# Patient Record
Sex: MALE | Race: BLACK OR AFRICAN AMERICAN
[De-identification: names, ages, dates, MRNs, and addresses within clinical notes are randomized per-mention and may not be internally consistent; named-entity substitution may affect disease eponyms.]

---

## 2017-04-03 ENCOUNTER — HOSPITAL ENCOUNTER (EMERGENCY)
Dept: HOSPITAL 17 - NED | Age: 22
Discharge: LEFT BEFORE DISCHARGE | End: 2017-04-03
Payer: COMMERCIAL

## 2017-04-03 VITALS
TEMPERATURE: 98 F | DIASTOLIC BLOOD PRESSURE: 65 MMHG | RESPIRATION RATE: 20 BRPM | HEART RATE: 77 BPM | OXYGEN SATURATION: 99 % | SYSTOLIC BLOOD PRESSURE: 127 MMHG

## 2017-04-03 DIAGNOSIS — R06.2: Primary | ICD-10-CM

## 2017-04-03 DIAGNOSIS — Z53.21: ICD-10-CM

## 2017-04-03 PROCEDURE — 93005 ELECTROCARDIOGRAM TRACING: CPT

## 2017-04-03 PROCEDURE — 71020: CPT

## 2017-04-03 NOTE — PD
Physical Exam


Date Seen by Provider:  Apr 3, 2017


Time Seen by Provider:  17:45


Narrative


21 YOBM C/O CP ASSOCIATED WITH COLD THIS PAST WEEK. POS F/C,COUGH,SOB,WHEEZING. 

WORSE WITH DEEP BREATH AND MOVEMENT





VVS. AWAITING BED PLACEMENT





Data


Data


Last Documented VS





Vital Signs








  Date Time  Temp Pulse Resp B/P Pulse Ox O2 Delivery O2 Flow Rate FiO2


 


4/3/17 15:29 98.0 77 20 127/65 99 Room Air  








Orders





 Electrocardiogram (4/3/17 17:44)


Chest, Pa & Lat (4/3/17 17:44)








MDM


Medical Record Reviewed:  Yes


Supervised Visit with BILLY:  Yes








Curt Strickland Apr 3, 2017 17:47

## 2017-04-04 NOTE — EKG
Date Performed: 04/03/2017       Time Performed: 17:48:15

 

PTAGE:      21 years

 

EKG:      Sinus rhythm 

 

 NORMAL ECG 

 

NO PREVIOUS TRACING            

 

DOCTOR:   Tatianna Oneal  Interpretating Date/Time  04/04/2017 10:25:53

## 2017-07-08 ENCOUNTER — HOSPITAL ENCOUNTER (EMERGENCY)
Dept: HOSPITAL 17 - NEPC | Age: 22
LOS: 1 days | Discharge: HOME | End: 2017-07-09
Payer: SELF-PAY

## 2017-07-08 VITALS — WEIGHT: 194.01 LBS | HEIGHT: 70 IN | BODY MASS INDEX: 27.77 KG/M2

## 2017-07-08 VITALS
OXYGEN SATURATION: 96 % | SYSTOLIC BLOOD PRESSURE: 132 MMHG | TEMPERATURE: 99.3 F | DIASTOLIC BLOOD PRESSURE: 85 MMHG | RESPIRATION RATE: 16 BRPM | HEART RATE: 88 BPM

## 2017-07-08 DIAGNOSIS — A08.4: Primary | ICD-10-CM

## 2017-07-08 DIAGNOSIS — J45.909: ICD-10-CM

## 2017-07-08 LAB
BASOPHILS # BLD AUTO: 0 TH/MM3 (ref 0–0.2)
BASOPHILS NFR BLD: 0.5 % (ref 0–2)
EOSINOPHIL # BLD: 0 TH/MM3 (ref 0–0.4)
EOSINOPHIL NFR BLD: 0.4 % (ref 0–4)
ERYTHROCYTE [DISTWIDTH] IN BLOOD BY AUTOMATED COUNT: 15.3 % (ref 11.6–17.2)
HCT VFR BLD CALC: 41.3 % (ref 39–51)
HEMO FLAGS: (no result)
LYMPHOCYTES # BLD AUTO: 1.6 TH/MM3 (ref 1–4.8)
LYMPHOCYTES NFR BLD AUTO: 34.6 % (ref 9–44)
MCH RBC QN AUTO: 24.5 PG (ref 27–34)
MCHC RBC AUTO-ENTMCNC: 32.1 % (ref 32–36)
MCV RBC AUTO: 76.4 FL (ref 80–100)
MONOCYTES NFR BLD: 16.6 % (ref 0–8)
NEUTROPHILS # BLD AUTO: 2.1 TH/MM3 (ref 1.8–7.7)
NEUTROPHILS NFR BLD AUTO: 47.9 % (ref 16–70)
PLATELET # BLD: 201 TH/MM3 (ref 150–450)
RBC # BLD AUTO: 5.41 MIL/MM3 (ref 4.5–5.9)
WBC # BLD AUTO: 4.5 TH/MM3 (ref 4–11)

## 2017-07-08 PROCEDURE — 83690 ASSAY OF LIPASE: CPT

## 2017-07-08 PROCEDURE — 80053 COMPREHEN METABOLIC PANEL: CPT

## 2017-07-08 PROCEDURE — 96374 THER/PROPH/DIAG INJ IV PUSH: CPT

## 2017-07-08 PROCEDURE — 85025 COMPLETE CBC W/AUTO DIFF WBC: CPT

## 2017-07-08 PROCEDURE — 96361 HYDRATE IV INFUSION ADD-ON: CPT

## 2017-07-08 PROCEDURE — 99284 EMERGENCY DEPT VISIT MOD MDM: CPT

## 2017-07-08 PROCEDURE — 87804 INFLUENZA ASSAY W/OPTIC: CPT

## 2017-07-08 NOTE — PD
HPI


Chief Complaint:  GI Complaint


Time Seen by Provider:  23:21


Travel History


International Travel<30 days:  No


Contact w/Intl Traveler<30days:  No


Traveled to known affect area:  No





History of Present Illness


HPI


C/O NV/D AND CRAMPY ABD PAIN ONGOING FOR 1 DAY, NO IMPROVEMENT YET, PT DENIES 

FEVER/HA/CP, STATES THAT CURRENTLY IS A BIT NAUSEOUS BUT NOT IN PAIN





PFSH


Past Medical History


Medical History:  Denies Significant Hx


Asthma:  Yes


Diminished Hearing:  No


Immunizations Current:  Yes


Tetanus Vaccination:  Unknown





Past Surgical History


Surgical History:  No Previous Surgery





Social History


Alcohol Use:  No (occ)


Tobacco Use:  No


Substance Use:  No





Allergies-Medications


(Allergen,Severity, Reaction):  


Coded Allergies:  


     No Known Allergies (Verified , 7/8/17)


Reported Meds & Prescriptions





Reported Meds & Active Scripts


Active


Zofran Odt (Ondansetron Odt) 4 Mg Tab 4 Mg SL Q6HR PRN


Ultram (Tramadol HCl) 50 Mg Tab 50 Mg PO Q4H PRN


Lomotil (Diphenoxylate-Atropine) 2.5-0.025 Mg Tab 1 Tab PO Q6H PRN








Review of Systems


Except as stated in HPI:  all other systems reviewed are Neg


Gastrointestinal:  Positive: Nausea, Vomiting, Diarrhea, Abdominal Pain





Physical Exam


Narrative


GENERAL: 


SKIN: Warm and dry.


HEAD: Atraumatic. Normocephalic. 


EYES: Pupils equal and round. No scleral icterus. No injection or drainage. 


ENT: No nasal bleeding or discharge.  Mucous membranes pink and moist.


NECK: Trachea midline. No JVD. 


CARDIOVASCULAR: Regular rate and rhythm.  


RESPIRATORY: No accessory muscle use. Clear to auscultation. Breath sounds 

equal bilaterally. 


GASTROINTESTINAL: Abdomen soft, non-tender, nondistended. Hepatic and splenic 

margins not palpable. 


MUSCULOSKELETAL: Extremities without clubbing, cyanosis, or edema. No obvious 

deformities. 


NEUROLOGICAL: Awake and alert. No obvious cranial nerve deficits.  Motor 

grossly within normal limits. Five out of 5 muscle strength in the arms and 

legs.  Normal speech.


PSYCHIATRIC: Appropriate mood and affect; insight and judgment normal.





Data


Data


Last Documented VS





Vital Signs








  Date Time  Temp Pulse Resp B/P Pulse Ox O2 Delivery O2 Flow Rate FiO2


 


7/8/17 23:17 99.3 88 16 132/85 96   








Orders





 Complete Blood Count With Diff (7/8/17 23:21)


Comprehensive Metabolic Panel (7/8/17 23:21)


Lipase (7/8/17 23:21)


Influenzae A/B Antigen (7/8/17 23:21)


Iv Access Insert/Monitor (7/8/17 23:21)


Ecg Monitoring (7/8/17 23:21)


Oximetry (7/8/17 23:21)


Sodium Chlor 0.9% 1000 Ml Inj (Ns 1000 M (7/8/17 23:30)


Ondansetron Inj (Zofran Inj) (7/8/17 23:30)





Labs





 Laboratory Tests








Test 7/8/17





 23:30


 


White Blood Count 4.5 TH/MM3


 


Red Blood Count 5.41 MIL/MM3


 


Hemoglobin 13.3 GM/DL


 


Hematocrit 41.3 %


 


Mean Corpuscular Volume 76.4 FL


 


Mean Corpuscular Hemoglobin 24.5 PG


 


Mean Corpuscular Hemoglobin 32.1 %





Concent 


 


Red Cell Distribution Width 15.3 %


 


Platelet Count 201 TH/MM3


 


Mean Platelet Volume 6.8 FL


 


Neutrophils (%) (Auto) 47.9 %


 


Lymphocytes (%) (Auto) 34.6 %


 


Monocytes (%) (Auto) 16.6 %


 


Eosinophils (%) (Auto) 0.4 %


 


Basophils (%) (Auto) 0.5 %


 


Neutrophils # (Auto) 2.1 TH/MM3


 


Lymphocytes # (Auto) 1.6 TH/MM3


 


Monocytes # (Auto) 0.7 TH/MM3


 


Eosinophils # (Auto) 0.0 TH/MM3


 


Basophils # (Auto) 0.0 TH/MM3


 


CBC Comment DIFF FINAL 


 


Differential Comment  


 


Sodium Level 138 MEQ/L


 


Potassium Level 3.6 MEQ/L


 


Chloride Level 105 MEQ/L


 


Carbon Dioxide Level 26.5 MEQ/L


 


Anion Gap 7 MEQ/L


 


Blood Urea Nitrogen 16 MG/DL


 


Creatinine 1.14 MG/DL


 


Estimat Glomerular Filtration 96 ML/MIN





Rate 


 


Random Glucose 89 MG/DL


 


Calcium Level 9.1 MG/DL


 


Total Bilirubin 0.3 MG/DL


 


Aspartate Amino Transf 28 U/L





(AST/SGOT) 


 


Alanine Aminotransferase 44 U/L





(ALT/SGPT) 


 


Alkaline Phosphatase 56 U/L


 


Total Protein 7.5 GM/DL


 


Albumin 4.0 GM/DL


 


Lipase 90 U/L











[facility]


Medical Decision Making


Medical Screen Exam Complete:  Yes


Emergency Medical Condition:  Yes


Medical Record Reviewed:  Yes


Differential Diagnosis


VIRAL V BACTERIAL ENTERITIS V LIVER/PANCREATITIS


Narrative Course


PATIENT EVALUATED AND NOT FOUND TO HAVE ANY COMPLICATIONS (SUCH AS HEPATITIS, 

PANCREATITIS, ELECTROLYTE ABNL) PAIN CONTROLLED AND TOLERATED PO, WILL D/C HOME





Diagnosis





 Primary Impression:  


 VIRAL GASTROENTERITIS


Scripts


Ondansetron Odt (Zofran Odt)4 Mg Tab4 Mg SL Q6HR PRN (Nausea/Vomiting) #12 TAB


   Prov:Denis Lorenzo MD         7/9/17 


Tramadol (Ultram)50 Mg Tab50 Mg PO Q4H PRN (PAIN) #28 TAB


   Prov:Denis Lorenzo MD         7/9/17 


Diphenoxylate-Atropine (Lomotil)2.5-0.025 Mg Tab1 Tab PO Q6H PRN (DIARRHEA) #20 

TAB


   Prov:Denis Lorenzo MD         7/9/17


Disposition:  01 DISCHARGE HOME


Condition:  Stable








Denis Lorenzo MD Jul 8, 2017 23:30

## 2017-07-09 LAB
ALP SERPL-CCNC: 56 U/L (ref 45–117)
ALT SERPL-CCNC: 44 U/L (ref 12–78)
ANION GAP SERPL CALC-SCNC: 7 MEQ/L (ref 5–15)
AST SERPL-CCNC: 28 U/L (ref 15–37)
BILIRUB SERPL-MCNC: 0.3 MG/DL (ref 0.2–1)
BUN SERPL-MCNC: 16 MG/DL (ref 7–18)
CHLORIDE SERPL-SCNC: 105 MEQ/L (ref 98–107)
GFR SERPLBLD BASED ON 1.73 SQ M-ARVRAT: 96 ML/MIN (ref 89–?)
HCO3 BLD-SCNC: 26.5 MEQ/L (ref 21–32)
POTASSIUM SERPL-SCNC: 3.6 MEQ/L (ref 3.5–5.1)
SODIUM SERPL-SCNC: 138 MEQ/L (ref 136–145)

## 2017-07-11 ENCOUNTER — HOSPITAL ENCOUNTER (EMERGENCY)
Dept: HOSPITAL 17 - NEPD | Age: 22
Discharge: HOME | End: 2017-07-11
Payer: SELF-PAY

## 2017-07-11 VITALS — HEIGHT: 70 IN | BODY MASS INDEX: 28.41 KG/M2 | WEIGHT: 198.42 LBS

## 2017-07-11 VITALS
OXYGEN SATURATION: 99 % | SYSTOLIC BLOOD PRESSURE: 132 MMHG | DIASTOLIC BLOOD PRESSURE: 71 MMHG | RESPIRATION RATE: 15 BRPM | HEART RATE: 56 BPM | TEMPERATURE: 98.2 F

## 2017-07-11 DIAGNOSIS — R51: Primary | ICD-10-CM

## 2017-07-11 PROCEDURE — 96361 HYDRATE IV INFUSION ADD-ON: CPT

## 2017-07-11 PROCEDURE — 96375 TX/PRO/DX INJ NEW DRUG ADDON: CPT

## 2017-07-11 PROCEDURE — 96374 THER/PROPH/DIAG INJ IV PUSH: CPT

## 2017-07-11 PROCEDURE — 99284 EMERGENCY DEPT VISIT MOD MDM: CPT

## 2017-07-11 NOTE — PD
Physical Exam


Time Seen by Provider:  16:00


Narrative


22 y/o male with n/v/d, headache, congestion.  Seen here on 7/8 and dx with 

viral gastroenteritis.  The vomiting and diarrhea have resolved but the 

congestion and h/a persist.








Vital signs reviewed.


Seen at triage desk.  Awaiting bed placement.





Data


Data


Last Documented VS





Vital Signs








  Date Time  Temp Pulse Resp B/P Pulse Ox O2 Delivery O2 Flow Rate FiO2


 


7/11/17 15:31 98.2 56 15 132/71 99   











MDM


Medical Record Reviewed:  Yes


Supervised Visit with BILLY:  Gerardo Valadez Jul 11, 2017 16:02

## 2017-07-11 NOTE — PD
HPI


.


Cold


Chief Complaint:  Cold / Flu Symptoms


Time Seen by Provider:  17:36


Travel History


International Travel<30 days:  No


Contact w/Intl Traveler<30days:  No


Traveled to known affect area:  No





History of Present Illness


HPI


Patient presents with about a 3 or 4 day history of cold symptoms.  He is 

complaining with nasal congestion, rhinorrhea, headache, dizziness, photophobia 

and loose stools.  He was seen here a couple days ago with loose stools.  He 

states that that has improved.  He reports one loose stool while here waiting 

for room.  He states that his head pain is an aching sensation just unrelieved 

by Aleve and which he rates as severe.  He reports no relieving or exacerbating 

factor.





PFSH


Past Medical History


Asthma:  Yes


Diminished Hearing:  No


Immunizations Current:  Yes


Influenza Vaccination:  No





Social History


Alcohol Use:  No (occ)


Tobacco Use:  No


Substance Use:  No





Allergies-Medications


(Allergen,Severity, Reaction):  


Coded Allergies:  


     No Known Allergies (Verified , 7/8/17)


Reported Meds & Prescriptions





Reported Meds & Active Scripts


Active


No Active Prescriptions or Reported Medications    








Review of Systems


Except as stated in HPI:  all other systems reviewed are Neg


General / Constitutional:  No: Fever, Chills


Eyes:  Positive: Photophobia,  No: Drainage, Redness


HENT:  Positive: Headaches, Congestion


Cardiovascular:  No: Chest Pain or Discomfort


Respiratory:  No: Cough


Gastrointestinal:  Positive: Diarrhea,  No: Nausea, Vomiting, Abdominal Pain





Physical Exam


Narrative


GENERAL: Patient is awake and alert.


SKIN: Warm and dry.


HEAD: Atraumatic. Normocephalic.  Scalp is nontender.


EYES: Pupils equal and round.  Extraocular movements are intact.


ENT: No nasal bleeding or discharge.  Mucous membranes pink and moist.  Sinuses 

are nontender to percussion.


NECK: Trachea midline.  Neck is supple with no cervical lymphadenopathy.


CARDIOVASCULAR: Regular rate and rhythm.  Heart sounds are normal.


RESPIRATORY: No accessory muscle use.  Lungs are clear with full air movement 

throughout.


GASTROINTESTINAL: Abdomen soft, non-tender, nondistended. 


MUSCULOSKELETAL: No obvious deformities.   No edema. 


NEUROLOGICAL: Awake and alert. No obvious cranial nerve deficits.  Motor 

grossly within normal limits. Normal speech.


PSYCHIATRIC: Appropriate mood and affect; insight and judgment normal.





Data


Data


Last Documented VS





Vital Signs








  Date Time  Temp Pulse Resp B/P Pulse Ox O2 Delivery O2 Flow Rate FiO2


 


7/11/17 17:47   18   Room Air  


 


7/11/17 15:31 98.2 56  132/71 99   








Orders





 Ecg Monitoring (7/11/17 17:46)


Iv Access Insert/Monitor (7/11/17 17:46)


Oximetry (7/11/17 17:46)


Sodium Chloride 0.9% Flush (Ns Flush) (7/11/17 18:00)


Ketorolac Inj (Toradol Inj) (7/11/17 18:00)


Prochlorperazine Inj (Compazine Inj) (7/11/17 18:00)


Diphenhydramine Inj (Benadryl Inj) (7/11/17 18:00)


Sodium Chlor 0.9% 1000 Ml Inj (Ns 1000 M (7/11/17 17:46)








MDM


Medical Decision Making


Medical Screen Exam Complete:  Yes


Emergency Medical Condition:  Yes


Differential Diagnosis


Differential diagnosis includes but is not limited to influenza, upper 

respiratory infection, bronchitis, pneumonia


Narrative Course


This patient presents with a several day history of cold symptoms.  His 

physical exam is reassuring.  His vital signs are unremarkable.





He will be treated here with a liter of IV fluid as well as IV Compazine, 

Benadryl and Toradol.  I anticipate discharge following this treatment.





The patient is now lying on the stretcher and looks pretty comfortable.  He now 

states that he is cold.





Diagnosis





 Primary Impression:  


 Cold


 Additional Impression:  


 Headache


 Qualified Code:  R51 - Acute nonintractable headache, unspecified headache type


Patient Instructions:  Acute Headache (DC), Cold Symptoms (ED), General 

Instructions





***Additional Instructions:


I recommend the use of a Neti Pot.


You may use a nasal spray such as Afrin for up to 3 days as needed for nasal 

congestion.


You may take an over-the-counter antihistamine such as Zyrtec, Allegra or 

Claritin as needed for runny secretions.


You may take pseudoephedrine as needed for congestion.  You will need to sign 

for this at the pharmacy.


You may take plain Mucinex, 1200 mg twice a day as needed for thick secretions.


You may take a cough syrup such as Delsym as needed for cough.


Motrin as needed for fever and body aches.


Throat lozenges/sprays as needed for sore throat.


Warm salt water gargles for sore throat.


Hot tea with lemon and honey also helps soothe a sore throat.


Scripts


No Active Prescriptions or Reported Meds


Disposition:  01 DISCHARGE HOME


Condition:  Stable








Sun Ulloa MD Jul 11, 2017 18:22

## 2017-09-13 ENCOUNTER — HOSPITAL ENCOUNTER (EMERGENCY)
Dept: HOSPITAL 17 - NEPD | Age: 22
LOS: 1 days | Discharge: HOME | End: 2017-09-14
Payer: COMMERCIAL

## 2017-09-13 VITALS — BODY MASS INDEX: 28.72 KG/M2 | HEIGHT: 70 IN | WEIGHT: 200.62 LBS

## 2017-09-13 VITALS — TEMPERATURE: 98.4 F

## 2017-09-13 VITALS
HEART RATE: 102 BPM | RESPIRATION RATE: 14 BRPM | TEMPERATURE: 100.2 F | DIASTOLIC BLOOD PRESSURE: 73 MMHG | SYSTOLIC BLOOD PRESSURE: 136 MMHG | OXYGEN SATURATION: 100 %

## 2017-09-13 DIAGNOSIS — A08.4: Primary | ICD-10-CM

## 2017-09-13 LAB
ALP SERPL-CCNC: 53 U/L (ref 45–117)
ALT SERPL-CCNC: 47 U/L (ref 12–78)
ANION GAP SERPL CALC-SCNC: 8 MEQ/L (ref 5–15)
AST SERPL-CCNC: 21 U/L (ref 15–37)
BASOPHILS # BLD AUTO: 0 TH/MM3 (ref 0–0.2)
BASOPHILS NFR BLD: 0.1 % (ref 0–2)
BILIRUB SERPL-MCNC: 0.4 MG/DL (ref 0.2–1)
BUN SERPL-MCNC: 14 MG/DL (ref 7–18)
CHLORIDE SERPL-SCNC: 104 MEQ/L (ref 98–107)
COLOR UR: YELLOW
COMMENT (UR): (no result)
CULTURE IF INDICATED: (no result)
EOSINOPHIL # BLD: 0 TH/MM3 (ref 0–0.4)
EOSINOPHIL NFR BLD: 0.2 % (ref 0–4)
ERYTHROCYTE [DISTWIDTH] IN BLOOD BY AUTOMATED COUNT: 16 % (ref 11.6–17.2)
GFR SERPLBLD BASED ON 1.73 SQ M-ARVRAT: 104 ML/MIN (ref 89–?)
GLUCOSE UR STRIP-MCNC: (no result) MG/DL
HCO3 BLD-SCNC: 24.6 MEQ/L (ref 21–32)
HCT VFR BLD CALC: 46.9 % (ref 39–51)
HEMO FLAGS: (no result)
HGB UR QL STRIP: (no result)
KETONES UR STRIP-MCNC: (no result) MG/DL
LYMPHOCYTES # BLD AUTO: 0.4 TH/MM3 (ref 1–4.8)
LYMPHOCYTES NFR BLD AUTO: 4.8 % (ref 9–44)
MCH RBC QN AUTO: 25.1 PG (ref 27–34)
MCHC RBC AUTO-ENTMCNC: 32.6 % (ref 32–36)
MCV RBC AUTO: 77 FL (ref 80–100)
MONOCYTES NFR BLD: 5.5 % (ref 0–8)
MUCOUS THREADS #/AREA URNS LPF: (no result) /LPF
NEUTROPHILS # BLD AUTO: 7.2 TH/MM3 (ref 1.8–7.7)
NEUTROPHILS NFR BLD AUTO: 89.4 % (ref 16–70)
NEUTS BAND # BLD MANUAL: 7.7 TH/MM3 (ref 1.8–7.7)
NEUTS BAND NFR BLD: 12 % (ref 0–6)
NEUTS SEG NFR BLD MANUAL: 83 % (ref 16–70)
NITRITE UR QL STRIP: (no result)
PLAT MORPH BLD: NORMAL
PLATELET # BLD: 268 TH/MM3 (ref 150–450)
PLATELET BLD QL SMEAR: NORMAL
POTASSIUM SERPL-SCNC: 3.9 MEQ/L (ref 3.5–5.1)
RBC # BLD AUTO: 6.09 MIL/MM3 (ref 4.5–5.9)
SCAN/DIFF: (no result)
SODIUM SERPL-SCNC: 137 MEQ/L (ref 136–145)
SP GR UR STRIP: 1.03 (ref 1–1.03)
SQUAMOUS #/AREA URNS HPF: <1 /HPF (ref 0–5)
WBC # BLD AUTO: 8.1 TH/MM3 (ref 4–11)
WBC DIFF SAMPLE: 100
WBC TOXIC VACUOLES BLD QL SMEAR: PRESENT

## 2017-09-13 PROCEDURE — 99284 EMERGENCY DEPT VISIT MOD MDM: CPT

## 2017-09-13 PROCEDURE — 80053 COMPREHEN METABOLIC PANEL: CPT

## 2017-09-13 PROCEDURE — 85027 COMPLETE CBC AUTOMATED: CPT

## 2017-09-13 PROCEDURE — 96374 THER/PROPH/DIAG INJ IV PUSH: CPT

## 2017-09-13 PROCEDURE — 85007 BL SMEAR W/DIFF WBC COUNT: CPT

## 2017-09-13 PROCEDURE — 83605 ASSAY OF LACTIC ACID: CPT

## 2017-09-13 PROCEDURE — 81001 URINALYSIS AUTO W/SCOPE: CPT

## 2017-09-13 PROCEDURE — 96375 TX/PRO/DX INJ NEW DRUG ADDON: CPT

## 2017-09-13 PROCEDURE — 87804 INFLUENZA ASSAY W/OPTIC: CPT

## 2017-09-13 PROCEDURE — 96361 HYDRATE IV INFUSION ADD-ON: CPT

## 2017-09-13 PROCEDURE — 83690 ASSAY OF LIPASE: CPT

## 2017-09-13 RX ADMIN — PHENYTOIN SODIUM SCH MLS/HR: 50 INJECTION INTRAMUSCULAR; INTRAVENOUS at 22:54

## 2017-09-13 RX ADMIN — PHENYTOIN SODIUM SCH MLS/HR: 50 INJECTION INTRAMUSCULAR; INTRAVENOUS at 22:13

## 2017-09-13 NOTE — PD
HPI


Chief Complaint:  GI Complaint


Time Seen by Provider:  21:49


Travel History


International Travel<30 days:  No


Contact w/Intl Traveler<30days:  No


Traveled to known affect area:  No





History of Present Illness


HPI


21-year-old Afro-American male presents emergency Department with history of 

headache last evening which is now resolved with nausea, vomiting, and diarrhea 

since 6 AM this morning.  Patient states he has been unable to keep anything 

down all day.  Last episode of emesis was at 7 PM.  Patient last had diarrhea 

approximately the same time.  He has generalized abdominal discomfort which she 

describes cramping and is a 6 out of 10.  He states he is still urinating.  

Patient denies headache now, or sore throat, congestion, or chest pain or 

congestion.  He denies eating anything bad that he can remember.  He has no 

known sick exposures.  He has no known drug allergies.





PFSH


Past Medical History


Asthma:  Yes (as a child)


Diminished Hearing:  No


Immunizations Current:  Yes


Tetanus Vaccination:  > 5 Years


Influenza Vaccination:  No





Past Surgical History


Surgical History:  No Previous Surgery





Social History


Alcohol Use:  Yes (occ)


Tobacco Use:  No


Substance Use:  No





Allergies-Medications


(Allergen,Severity, Reaction):  


Coded Allergies:  


     No Known Allergies (Verified , 9/13/17)


Reported Meds & Prescriptions





Reported Meds & Active Scripts


Active


Loperamide (Loperamide HCl) 2 Mg Cap 2 Mg PO AS DIRECTED PRN


     One capsule after each loose stool.


     Not to exceed 8 capsules per day.


Zofran (Ondansetron HCl) 4 Mg Tab 4 Mg PO Q6HR PRN








Review of Systems


Except as stated in HPI:  all other systems reviewed are Neg


General / Constitutional:  Positive: Fever, Chills


Eyes:  No: Visual changes


HENT:  No: Headaches, Vertigo, Lightheadedness, Sore Throat, Rhinitis, 

Rhinorrhea, Congestion, Nosebleed, Neck Stiffness, Neck Pain, Ear Discharge, 

Earache


Cardiovascular:  No: Chest Pain or Discomfort


Respiratory:  No: Cough, Shortness of Breath, Wheezing


Gastrointestinal:  Positive: Nausea, Vomiting, Diarrhea, Abdominal Pain, Loss 

of Appetite, No: Hematemesis, Hematochezia, Constipation, Changes in Bowel 

Habits, Indigestion, Dysphagia


Genitourinary:  No: Dysuria, Decreased Urinary Output


Musculoskeletal:  No: Pain


Skin:  No Rash


Neurologic:  No: Weakness


Psychiatric:  No: Depression


Endocrine:  No: Polydipsia


Hematologic/Lymphatic:  No: Easy Bruising





Physical Exam


Narrative


GENERAL: Patient is noted to be somewhat tachycardic with a fever of 100.5.  He 

otherwise appears in no acute distress.


SKIN: Warm and dry.  Normal color.  Decreased turgor.


HEAD: Atraumatic. Normocephalic. 


EYES: Pupils equal and round. No scleral icterus. No injection or drainage. 


ENT: No nasal bleeding or discharge.  Mucous membranes pink and dry.  Pharynx 

is clear.  Airway is patent.


NECK: Trachea midline.  Supple nontender. 


CARDIOVASCULAR: Regular rate and rhythm.  


RESPIRATORY: No accessory muscle use. Clear to auscultation. Breath sounds 

equal bilaterally. 


GASTROINTESTINAL: Abdomen soft, mild to moderate generalized tenderness, 

nondistended.  No point tenderness or rebound.  No CVA tenderness.  Hepatic and 

splenic margins not palpable. 


MUSCULOSKELETAL: Extremities without clubbing, cyanosis, or edema. No obvious 

deformities. 


NEUROLOGICAL: Awake and alert. No obvious cranial nerve deficits.  Motor 

grossly within normal limits. Five out of 5 muscle strength in the arms and 

legs.  Normal speech.


PSYCHIATRIC: Appropriate mood and affect; insight and judgment normal.





Data


Data


Last Documented VS





Vital Signs








  Date Time  Temp Pulse Resp B/P (MAP) Pulse Ox O2 Delivery O2 Flow Rate FiO2


 


9/13/17 23:09 98.4       


 


9/13/17 22:23      Room Air  


 


9/13/17 20:34  102 14  100   








Orders





 Orders


Complete Blood Count With Diff (9/13/17 21:54)


Comprehensive Metabolic Panel (9/13/17 21:54)


Lipase (9/13/17 21:54)


Lactic Acid (9/13/17 21:54)


Urinalysis - C+S If Indicated (9/13/17 21:54)


Iv Access Insert/Monitor (9/13/17 21:54)


Ecg Monitoring (9/13/17 21:54)


Oximetry (9/13/17 21:54)


Ondansetron Inj (Zofran Inj) (9/13/17 22:00)


Sodium Chlor 0.9% 1000 Ml Inj (Ns 1000 M (9/13/17 21:54)


Sodium Chloride 0.9% Flush (Ns Flush) (9/13/17 22:00)


Famotidine Inj (Pepcid Inj) (9/13/17 22:00)


Ketorolac Inj (Toradol Inj) (9/13/17 22:00)


Influenzae A/B Antigen (9/13/17 21:54)


Sodium Chlor 0.9% 1000 Ml Inj (Ns 1000 M (9/13/17 23:15)





Labs





Laboratory Tests








Test


  9/13/17


22:05 9/13/17


22:17


 


White Blood Count 8.1 TH/MM3  


 


Red Blood Count 6.09 MIL/MM3  


 


Hemoglobin 15.3 GM/DL  


 


Hematocrit 46.9 %  


 


Mean Corpuscular Volume 77.0 FL  


 


Mean Corpuscular Hemoglobin 25.1 PG  


 


Mean Corpuscular Hemoglobin


Concent 32.6 % 


  


 


 


Red Cell Distribution Width 16.0 %  


 


Platelet Count 268 TH/MM3  


 


Mean Platelet Volume 7.1 FL  


 


Neutrophils (%) (Auto) 89.4 %  


 


Lymphocytes (%) (Auto) 4.8 %  


 


Monocytes (%) (Auto) 5.5 %  


 


Eosinophils (%) (Auto) 0.2 %  


 


Basophils (%) (Auto) 0.1 %  


 


Neutrophils # (Auto) 7.2 TH/MM3  


 


Lymphocytes # (Auto) 0.4 TH/MM3  


 


Monocytes # (Auto) 0.4 TH/MM3  


 


Eosinophils # (Auto) 0.0 TH/MM3  


 


Basophils # (Auto) 0.0 TH/MM3  


 


CBC Comment AUTO DIFF  


 


Blood Urea Nitrogen 14 MG/DL  


 


Creatinine 1.07 MG/DL  


 


Random Glucose 105 MG/DL  


 


Total Protein 8.3 GM/DL  


 


Albumin 4.6 GM/DL  


 


Calcium Level 9.9 MG/DL  


 


Alkaline Phosphatase 53 U/L  


 


Aspartate Amino Transf


(AST/SGOT) 21 U/L 


  


 


 


Alanine Aminotransferase


(ALT/SGPT) 47 U/L 


  


 


 


Total Bilirubin 0.4 MG/DL  


 


Sodium Level 137 MEQ/L  


 


Potassium Level 3.9 MEQ/L  


 


Chloride Level 104 MEQ/L  


 


Carbon Dioxide Level 24.6 MEQ/L  


 


Anion Gap 8 MEQ/L  


 


Estimat Glomerular Filtration


Rate 104 ML/MIN 


  


 


 


Lactic Acid Level 2.7 mmol/L  


 


Lipase 47 U/L  


 


Urine Color  YELLOW 


 


Urine Turbidity  HAZY 


 


Urine pH  6.0 


 


Urine Specific Gravity  1.030 


 


Urine Protein  30 mg/dL 


 


Urine Glucose (UA)  NEG mg/dL 


 


Urine Ketones  TRACE mg/dL 


 


Urine Occult Blood  NEG 


 


Urine Nitrite  NEG 


 


Urine Bilirubin  NEG 


 


Urine Urobilinogen


  


  LESS THAN 2.0


MG/DL


 


Urine Leukocyte Esterase  TRACE 


 


Urine RBC  1 /hpf 


 


Urine WBC  4 /hpf 


 


Urine Squamous Epithelial


Cells 


  <1 /hpf 


 


 


Urine Amorphous Sediment  RARE 


 


Urine Mucus  FEW /lpf 


 


Microscopic Urinalysis Comment


  


  CULT NOT


INDICATED











MDM


Medical Decision Making


Medical Screen Exam Complete:  Yes


Emergency Medical Condition:  Yes


Differential Diagnosis


Nausea and vomiting.  Fever.  Diarrhea.  Gastroenteritis.  Influenza.


Narrative Course


Patient is medically stable at time of exam.


Labs ordered including CBC, CMP, lactic acid, lipase, and urinalysis.


IV access is obtained patient is given 4 mg Zofran IV, and 20 mg Pepcid IV.


Patient is given Toradol 30 mg IV.


Patient is given 2000 mL was normal saline bolus.


Rapid influenza is ordered as well.


Urinalysis is unremarkable except for a specific gravity 1.030, protein 30, 

trace of ketones, trace leukocyte esterase, but no need for culture identified.


CBC shows no significant leukocytosis, but neutrophils are 89.4%.


CMP showed no significant findings.


Lactic acid is 2.7, however this is felt to be due to his dehydration.


Patient will be sent home with Zofran 4 mg every 6 hours when necessary nausea.


Patient also given loperamide as directed #12.


Patient is to rest and push fluids





Diagnosis





 Primary Impression:  


 Gastroenteritis and colitis, viral


Referrals:  


Primary Care Physician


Patient Instructions:  Acute Nausea and Vomiting (ED), Colitis (ED), General 

Instructions


***Med/Other Pt SpecificInfo:  Prescription(s) given


Scripts


Loperamide (Loperamide) 2 Mg Cap


2 MG PO AS DIRECTED Y for DIARRHEA, #12 CAP 0 Refills


   One capsule after each loose stool.


   Not to exceed 8 capsules per day.


   Prov: Sariah Erazo DO         9/13/17 


Ondansetron (Zofran) 4 Mg Tab


4 MG PO Q6HR Y for NAUSEA OR VOMITING, #15 TAB 0 Refills


   Prov: Sariah Erazo DO         9/13/17


Disposition:  01 DISCHARGE HOME


Condition:  Stable











Tarik Dobbins Sep 13, 2017 21:53

## 2018-02-10 ENCOUNTER — HOSPITAL ENCOUNTER (EMERGENCY)
Dept: HOSPITAL 17 - NEPE | Age: 23
Discharge: HOME | End: 2018-02-10
Payer: COMMERCIAL

## 2018-02-10 VITALS
TEMPERATURE: 98.5 F | OXYGEN SATURATION: 99 % | HEART RATE: 75 BPM | DIASTOLIC BLOOD PRESSURE: 58 MMHG | SYSTOLIC BLOOD PRESSURE: 127 MMHG | RESPIRATION RATE: 14 BRPM

## 2018-02-10 VITALS
DIASTOLIC BLOOD PRESSURE: 70 MMHG | SYSTOLIC BLOOD PRESSURE: 128 MMHG | OXYGEN SATURATION: 98 % | RESPIRATION RATE: 16 BRPM | HEART RATE: 58 BPM

## 2018-02-10 VITALS — WEIGHT: 198.42 LBS | BODY MASS INDEX: 28.41 KG/M2 | HEIGHT: 70 IN

## 2018-02-10 VITALS
DIASTOLIC BLOOD PRESSURE: 72 MMHG | OXYGEN SATURATION: 98 % | SYSTOLIC BLOOD PRESSURE: 130 MMHG | TEMPERATURE: 97 F | RESPIRATION RATE: 20 BRPM | HEART RATE: 77 BPM

## 2018-02-10 DIAGNOSIS — G51.0: Primary | ICD-10-CM

## 2018-02-10 PROCEDURE — 70450 CT HEAD/BRAIN W/O DYE: CPT

## 2018-02-10 PROCEDURE — 99283 EMERGENCY DEPT VISIT LOW MDM: CPT

## 2018-02-10 NOTE — PD
HPI


Chief Complaint:  Neuro Symptoms/ Deficits


Time Seen by Provider:  13:52


Travel History


International Travel<30 days:  No


Contact w/Intl Traveler<30days:  No


Traveled to known affect area:  No





History of Present Illness


HPI


The patient is a 22-year-old  [place]ian[place] male who presents to the emergency 

department for right facial paralysis that occurred earlier today.  The patient 

states he had a mild left-sided headache, then noticed that he had right facial 

paralysis.  He has difficulty smiling the right side of his face, also notes he 

has difficulty raising his right eyebrow and has difficulty closing his right 

eye completely.  He does note diminished sensation on the right side of the 

face.  He denies any history of TIA, CVA, chronic migraines, or previous Bell's 

palsy.  He denies any weakness or numbness of the arms or legs.  He denies any 

associated dizziness or ataxia.  Symptoms are mild to moderate, there are no 

current alleviating or exacerbating factors.





PFSH


Past Medical History


Asthma:  Yes (as a child)


Diminished Hearing:  No


Immunizations Current:  Yes


Pregnant?:  Not Pregnant





Past Surgical History


Surgical History:  No Previous Surgery





Social History


Alcohol Use:  Yes (occ)


Tobacco Use:  No


Substance Use:  No





Allergies-Medications


(Allergen,Severity, Reaction):  


Coded Allergies:  


     No Known Allergies (Verified  Adverse Reaction, Unknown, 2/10/18)


Reported Meds & Prescriptions





Reported Meds & Active Scripts


Active


Loperamide (Loperamide HCl) 2 Mg Cap 2 Mg PO AS DIRECTED PRN


     One capsule after each loose stool.


     Not to exceed 8 capsules per day.


Zofran (Ondansetron HCl) 4 Mg Tab 4 Mg PO Q6HR PRN








Review of Systems


Except as stated in HPI:  all other systems reviewed are Neg


General / Constitutional:  No: Fever


Eyes:  No: Blurred Vision


HENT:  Positive: Headaches


Cardiovascular:  No: Chest Pain or Discomfort


Respiratory:  No: Shortness of Breath


Gastrointestinal:  No: Nausea, Vomiting, Abdominal Pain


Musculoskeletal:  No: Weakness


Neurologic:  Positive: Weakness, Focal Abnormalities, No: Change in Mentation, 

Slurred Speech





Physical Exam


Narrative


GENERAL: Awake, alert, pleasant 22-year-old male who appears his stated age and 

is in no acute respiratory distress.


SKIN: Focused skin assessment warm/dry.


HEAD: Atraumatic. Normocephalic. 


EYES: Pupils equal and round.  3 mm bilateral and reactive.  EOMs are intact.  

Patient is able to see fingers at a distance of 2 feet without difficulty.


ENT: No nasal bleeding or discharge.  Mucous membranes pink and moist.


NECK: Trachea midline. No JVD. 


CARDIOVASCULAR: Regular rate and rhythm.  No murmur appreciated.


RESPIRATORY: No accessory muscle use. Clear to auscultation. Breath sounds 

equal bilaterally. 


GASTROINTESTINAL: Abdomen soft, non-tender, nondistended. Hepatic and splenic 

margins not palpable. 


MUSCULOSKELETAL: No obvious deformities. No clubbing.  No cyanosis.  No edema. 


NEUROLOGICAL: Awake and alert.  Asymmetric smile with right facial droop.  

Patient has decreased ability to raise the right eyebrow when compared to the 

left.  Tongue is midline.  No drift of the upper or lower extremities.  

Diminished sensation of the tube and V3 compared to left, arms and legs are 

symmetric.  Finger to nose normal.


PSYCHIATRIC: Appropriate mood and affect; insight and judgment normal.





Data


Data


Last Documented VS





Vital Signs








  Date Time  Temp Pulse Resp B/P (MAP) Pulse Ox O2 Delivery O2 Flow Rate FiO2


 


2/10/18 15:29  58 16 128/70 (89) 98 Room Air  


 


2/10/18 13:50 97.0       








Orders





 Orders


Ct Brain W/O Iv Contrast(Rout) (2/10/18 )








[facility]


Medical Decision Making


Medical Screen Exam Complete:  Yes


Emergency Medical Condition:  Yes


Medical Record Reviewed:  Yes


Interpretation(s)





Last Impressions








Head CT 2/10/18 0000 Signed





Impressions: 





 Service Date/Time:  Saturday, February 10, 2018 15:11 - CONCLUSION:  Normal 





 examination.  No significant change has occurred.       Spenser Cleveland MD 








Differential Diagnosis


Differential diagnoses includes Bell's palsy, CVA, TIA, Campbell's paralysis, tumor 

intracranial tumor.


Narrative Course


CT of the brain was obtained.  The patient was administered Solu-Medrol 125 mg 

intravenously.  CT of the brain is negative.  The patient appears to have Bell'

s palsy, will be treated with steroids and antivirals.  He is advised to follow-

up with neurology.





Diagnosis





 Primary Impression:  


 Bell's palsy


Patient Instructions:  General Instructions





***Additional Instructions:  


Medications as directed.  Follow-up with her primary physician.  Return if 

symptoms worsen or progress.  Follow-up with neurology.  Please provide the 

patient a copy of his CT results at discharge.


***Med/Other Pt SpecificInfo:  Prescription(s) given


Scripts


Acyclovir (Acyclovir) 800 Mg Tab


800 MG PO TID for Mgmt Viral Infection for 5 Days, TAB 0 Refills


   Prov: Mark Hernandez MD         2/10/18 


Prednisone (Prednisone) 20 Mg Tab


40 MG PO AS DIRECTED for 5 Days, TAB 0 Refills


   Prov: Mark Hernandez MD         2/10/18


Disposition:  01 DISCHARGE HOME


Condition:  Stable











Mark Hernandez MD Feb 10, 2018 14:21

## 2018-02-10 NOTE — RADRPT
EXAM DATE/TIME:  02/10/2018 15:11 

 

[facility] COMPARISON:     

CT BRAIN W/O CONTRAST, April 16, 2016, 15:07.

 

 

INDICATIONS :     

Right side facial droop and numbness since yesterday.

                      

 

RADIATION DOSE:     

36.10 CTDIvol (mGy) 

 

 

 

MEDICAL HISTORY :     

None  

 

SURGICAL HISTORY :      

None. 

 

ENCOUNTER:      

Initial

 

ACUITY:      

2 days

 

PAIN SCALE:      

8/10

 

LOCATION:       

Right cranial 

 

TECHNIQUE:     

Multiple contiguous axial images were obtained of the head.  Using automated exposure control and adj
ustment of the mA and/or kV according to patient size, radiation dose was kept as low as reasonably a
chievable to obtain optimal diagnostic quality images.   DICOM format image data is available electro
nically for review and comparison.  

 

FINDINGS:     

 

CEREBRUM:     

The ventricles are normal for age.  No evidence of midline shift, mass lesion, hemorrhage or acute in
farction.  No extra-axial fluid collections are seen.

 

POSTERIOR FOSSA:     

The cerebellum and brainstem are intact.  The 4th ventricle is midline.  The cerebellopontine angle i
s unremarkable.

 

EXTRACRANIAL:     

The visualized portion of the orbits is intact.

 

SKULL:     

The calvaria is intact.  No evidence of skull fracture.

 

CONCLUSION:     

Normal examination.  No significant change has occurred.  

 

 

 

 Spenser Cleveland MD on February 10, 2018 at 15:20           

Board Certified Radiologist.

 This report was verified electronically.

## 2018-02-12 ENCOUNTER — HOSPITAL ENCOUNTER (EMERGENCY)
Dept: HOSPITAL 17 - NEPD | Age: 23
Discharge: HOME | End: 2018-02-12
Payer: COMMERCIAL

## 2018-02-12 VITALS
DIASTOLIC BLOOD PRESSURE: 71 MMHG | HEART RATE: 70 BPM | SYSTOLIC BLOOD PRESSURE: 118 MMHG | RESPIRATION RATE: 17 BRPM | OXYGEN SATURATION: 100 %

## 2018-02-12 VITALS
SYSTOLIC BLOOD PRESSURE: 131 MMHG | RESPIRATION RATE: 14 BRPM | DIASTOLIC BLOOD PRESSURE: 66 MMHG | TEMPERATURE: 98.4 F | HEART RATE: 77 BPM | OXYGEN SATURATION: 100 %

## 2018-02-12 VITALS
SYSTOLIC BLOOD PRESSURE: 128 MMHG | RESPIRATION RATE: 15 BRPM | DIASTOLIC BLOOD PRESSURE: 73 MMHG | OXYGEN SATURATION: 98 % | HEART RATE: 59 BPM

## 2018-02-12 VITALS — DIASTOLIC BLOOD PRESSURE: 73 MMHG | SYSTOLIC BLOOD PRESSURE: 128 MMHG

## 2018-02-12 VITALS — WEIGHT: 198.42 LBS | BODY MASS INDEX: 28.41 KG/M2 | HEIGHT: 70 IN

## 2018-02-12 DIAGNOSIS — Z79.899: ICD-10-CM

## 2018-02-12 DIAGNOSIS — R51: ICD-10-CM

## 2018-02-12 DIAGNOSIS — G51.0: Primary | ICD-10-CM

## 2018-02-12 LAB
BASOPHILS # BLD AUTO: 0 TH/MM3 (ref 0–0.2)
BASOPHILS NFR BLD: 1.2 % (ref 0–2)
BUN SERPL-MCNC: 12 MG/DL (ref 7–18)
CALCIUM SERPL-MCNC: 9 MG/DL (ref 8.5–10.1)
CHLORIDE SERPL-SCNC: 108 MEQ/L (ref 98–107)
CREAT SERPL-MCNC: 1.04 MG/DL (ref 0.6–1.3)
CRP SERPL-MCNC: (no result) MG/DL (ref 0–0.3)
EOSINOPHIL # BLD: 0.1 TH/MM3 (ref 0–0.4)
EOSINOPHIL NFR BLD: 1.8 % (ref 0–4)
ERYTHROCYTE [DISTWIDTH] IN BLOOD BY AUTOMATED COUNT: 15.6 % (ref 11.6–17.2)
GFR SERPLBLD BASED ON 1.73 SQ M-ARVRAT: 106 ML/MIN (ref 89–?)
GLUCOSE SERPL-MCNC: 92 MG/DL (ref 74–106)
HCO3 BLD-SCNC: 26.9 MEQ/L (ref 21–32)
HCT VFR BLD CALC: 41.5 % (ref 39–51)
HGB BLD-MCNC: 13.8 GM/DL (ref 13–17)
LYMPHOCYTES # BLD AUTO: 1.8 TH/MM3 (ref 1–4.8)
LYMPHOCYTES NFR BLD AUTO: 48.3 % (ref 9–44)
MCH RBC QN AUTO: 25.5 PG (ref 27–34)
MCHC RBC AUTO-ENTMCNC: 33.2 % (ref 32–36)
MCV RBC AUTO: 77 FL (ref 80–100)
MONOCYTE #: 0.3 TH/MM3 (ref 0–0.9)
MONOCYTES NFR BLD: 9.2 % (ref 0–8)
NEUTROPHILS # BLD AUTO: 1.5 TH/MM3 (ref 1.8–7.7)
NEUTROPHILS NFR BLD AUTO: 39.5 % (ref 16–70)
PLATELET # BLD: 276 TH/MM3 (ref 150–450)
PMV BLD AUTO: 6.7 FL (ref 7–11)
RBC # BLD AUTO: 5.4 MIL/MM3 (ref 4.5–5.9)
SODIUM SERPL-SCNC: 140 MEQ/L (ref 136–145)
WBC # BLD AUTO: 3.8 TH/MM3 (ref 4–11)

## 2018-02-12 PROCEDURE — 85652 RBC SED RATE AUTOMATED: CPT

## 2018-02-12 PROCEDURE — 70553 MRI BRAIN STEM W/O & W/DYE: CPT

## 2018-02-12 PROCEDURE — 99284 EMERGENCY DEPT VISIT MOD MDM: CPT

## 2018-02-12 PROCEDURE — 87801 DETECT AGNT MULT DNA AMPLI: CPT

## 2018-02-12 PROCEDURE — 86140 C-REACTIVE PROTEIN: CPT

## 2018-02-12 PROCEDURE — 96374 THER/PROPH/DIAG INJ IV PUSH: CPT

## 2018-02-12 PROCEDURE — 80048 BASIC METABOLIC PNL TOTAL CA: CPT

## 2018-02-12 PROCEDURE — 85025 COMPLETE CBC W/AUTO DIFF WBC: CPT

## 2018-02-12 PROCEDURE — A9579 GAD-BASE MR CONTRAST NOS,1ML: HCPCS

## 2018-02-12 PROCEDURE — 96375 TX/PRO/DX INJ NEW DRUG ADDON: CPT

## 2018-02-12 PROCEDURE — 96361 HYDRATE IV INFUSION ADD-ON: CPT

## 2018-02-12 NOTE — RADRPT
EXAM DATE/TIME:  02/12/2018 11:38 

 

[facility] COMPARISON:     

CT BRAIN W/O CONTRAST, February 10, 2018, 15:11.

       

 

 

INDICATIONS :     

Mass. Headaches for more then two weeks and right sided facial droop.

                     

 

CONTRAST:     

16 cc Omniscan (gadodiamide) IV

                     

 

MEDICAL HISTORY :     

None.     

 

SURGICAL HISTORY :     

None.     

 

ENCOUNTER:     

Initial

 

ACUITY:     

2 weeks

 

PAIN SCORE:     

3/10

 

LOCATION:     

Right cranial 

 

TECHNIQUE:     

Multiplanar, multisequence MRI of the brain was performed both prior to and following the administrat
ion of paramagnetic contrast.

 

FINDINGS:     

 

CEREBRUM:     

The ventricles are normal for age.  No evidence of midline shift, mass lesion, hemorrhage or acute in
farction.  No extraaxial fluid collections are seen.  The pituitary gland and suprasellar cistern are
 normal in configuration.

 

WHITE MATTER:     

No significant signal abnormalities are seen in the white matter.

 

POSTERIOR FOSSA:     

The cerebellum and brainstem are intact.  The 4th ventricle is midline. The cerebellopontine angle is
 unremarkable.  The cerebellar tonsils are normal in position.

 

DIFFUSION IMAGING:     

No focal areas of restricted diffusion are seen.  No evidence of acute infarction.

 

EXTRACRANIAL:     

The visualized portions of the orbits and paranasal sinuses are unremarkable.

 

POST-CONTRAST:     

No abnormal areas of parenchymal or dural enhancement.  No evidence of blood-brain barrier breakdown.
 Small enhancing lesion in the region of the left frontal calvarium measuring 5 x 11 x 12 mm.

 

CONCLUSION:     

1. No acute intracranial abnormality.

2. Area of enhancement left frontal calvarium, nonspecific. Followup study in 3 months recommended pedro Carrera MD on February 12, 2018 at 12:14           

Board Certified Radiologist.

 This report was verified electronically.

## 2018-02-12 NOTE — PD
HPI


Chief Complaint:  Headache


Time Seen by Provider:  09:15


Travel History


International Travel<30 days:  No


Contact w/Intl Traveler<30days:  No


Traveled to known affect area:  No





History of Present Illness


HPI


The patient is a 22-year-old  [place]ian[place] male who presents emergency 

department for headache, right sided facial paralysis, and difficulty with 

ambulation.  The patient was seen 2 days prior in the emergency department for 

right sided facial paralysis and was diagnosed with Bell's palsy.  The patient 

had a CT the brain at that time was negative.  He was prescribed prednisone and 

acyclovir, however, he did not fill his medications.  He now complains of a 

headache which is bitemporal, nonradiating, throbbing in nature.  He also 

complains of generalized weakness and dizziness with ambulation but denies any 

specific weakness or numbness to the upper or lower extremities.  He denies any 

nausea or vomiting.  He denies any fever, chills, or sweats.  Symptoms are 

moderate.  There are no current alleviating factors.





PFSH


Past Medical History


Asthma:  Yes (as a child)


Diminished Hearing:  No


Immunizations Current:  Yes


Tetanus Vaccination:  Unknown


Influenza Vaccination:  No





Past Surgical History


Surgical History:  No Previous Surgery





Social History


Alcohol Use:  Yes (occ)


Tobacco Use:  No


Substance Use:  No





Allergies-Medications


(Allergen,Severity, Reaction):  


Coded Allergies:  


     No Known Allergies (Verified  Adverse Reaction, Unknown, 2/12/18)


Reported Meds & Prescriptions





Reported Meds & Active Scripts


Active


Acyclovir 800 Mg Tab 800 Mg PO TID 5 Days


Prednisone 20 Mg Tab 40 Mg PO AS DIRECTED 5 Days


Loperamide (Loperamide HCl) 2 Mg Cap 2 Mg PO AS DIRECTED PRN


     One capsule after each loose stool.


     Not to exceed 8 capsules per day.


Zofran (Ondansetron HCl) 4 Mg Tab 4 Mg PO Q6HR PRN








Review of Systems


Except as stated in HPI:  all other systems reviewed are Neg


General / Constitutional:  No: Fever


Eyes:  No: Blurred Vision


HENT:  Positive: Headaches


Cardiovascular:  No: Chest Pain or Discomfort


Respiratory:  No: Shortness of Breath


Gastrointestinal:  No: Nausea, Vomiting, Abdominal Pain


Musculoskeletal:  Positive: Weakness, No: Myalgias


Skin:  No Rash


Neurologic:  Positive: Dizziness, Focal Abnormalities (as noted in the history 

of present illness), Headache, No: Change in Mentation, Slurred Speech





Physical Exam


Narrative


GENERAL: Awake, alert, 22-year-old male appears his stated age and is in no 

acute respiratory distress.


SKIN: Focused skin assessment warm/dry.


HEAD: Atraumatic. Normocephalic. 


EYES: Pupils equal and round.  4 mm bilateral and reactive.  EOMs are intact.  

Patient is able to see fingers at a distance of 2 feet without difficulty.


ENT: No nasal bleeding or discharge.  Mucous membranes pink and moist.


NECK: Trachea midline. No JVD. 


CARDIOVASCULAR: Regular rate and rhythm.  No murmur appreciated.


RESPIRATORY: No accessory muscle use. Clear to auscultation. Breath sounds 

equal bilaterally. 


GASTROINTESTINAL: Abdomen soft, non-tender, nondistended. Hepatic and splenic 

margins not palpable. 


MUSCULOSKELETAL: No obvious deformities. No clubbing.  No cyanosis.  No edema. 


NEUROLOGICAL: Awake and alert.  Right facial droop, diminished strength with 

close in the right eye.  Unable to raise the right eyebrow when compared to the 

left.  No dysarthria.  No drift of the upper or lower extremities.  No decrease 

in sensation of the arms and legs.  Diminished sensation in the V1, V2, V3 

distribution on the right.  No ataxia noted.  Finger to nose is normal.  Heel-to

-shin is normal.


PSYCHIATRIC: Appropriate mood and affect; insight and judgment normal.





Data


Data


Last Documented VS





Vital Signs








  Date Time  Temp Pulse Resp B/P (MAP) Pulse Ox O2 Delivery O2 Flow Rate FiO2


 


2/12/18 12:33  59 15 128/73 (91) 98 Room Air  


 


2/12/18 09:02 98.4       








Orders





 Orders


Mri Brain W&W/O Contrast (2/12/18 )


Complete Blood Count With Diff (2/12/18 09:28)


Basic Metabolic Panel (Bmp) (2/12/18 09:28)


Lyme Disease Pcr (2/12/18 09:28)


Westergren Sedimentation Rate (2/12/18 09:28)


C-Reactive Protein (Crp) (2/12/18 09:28)


Ecg Monitoring (2/12/18 09:28)


Iv Access Insert/Monitor (2/12/18 09:28)


Oximetry (2/12/18 09:28)


Sodium Chloride 0.9% Flush (Ns Flush) (2/12/18 09:30)


Ketorolac Inj (Toradol Inj) (2/12/18 09:30)


Prochlorperazine Inj (Compazine Inj) (2/12/18 09:30)


Diphenhydramine Inj (Benadryl Inj) (2/12/18 09:30)


Sodium Chlor 0.9% 1000 Ml Inj (Ns 1000 M (2/12/18 09:28)


Methylprednisolone So Succ Inj (Solumedr (2/12/18 09:30)


Gadodiamide Pf Inj (Omniscan Pf Inj) (2/12/18 09:01)





Labs





Laboratory Tests








Test


  2/12/18


09:50


 


White Blood Count 3.8 TH/MM3 


 


Red Blood Count 5.40 MIL/MM3 


 


Hemoglobin 13.8 GM/DL 


 


Hematocrit 41.5 % 


 


Mean Corpuscular Volume 77.0 FL 


 


Mean Corpuscular Hemoglobin 25.5 PG 


 


Mean Corpuscular Hemoglobin


Concent 33.2 % 


 


 


Red Cell Distribution Width 15.6 % 


 


Platelet Count 276 TH/MM3 


 


Mean Platelet Volume 6.7 FL 


 


Neutrophils (%) (Auto) 39.5 % 


 


Lymphocytes (%) (Auto) 48.3 % 


 


Monocytes (%) (Auto) 9.2 % 


 


Eosinophils (%) (Auto) 1.8 % 


 


Basophils (%) (Auto) 1.2 % 


 


Neutrophils # (Auto) 1.5 TH/MM3 


 


Lymphocytes # (Auto) 1.8 TH/MM3 


 


Monocytes # (Auto) 0.3 TH/MM3 


 


Eosinophils # (Auto) 0.1 TH/MM3 


 


Basophils # (Auto) 0.0 TH/MM3 


 


CBC Comment DIFF FINAL 


 


Differential Comment  


 


Erythrocyte Sedimentation Rate 1 mm/hr 


 


Blood Urea Nitrogen 12 MG/DL 


 


Creatinine 1.04 MG/DL 


 


Random Glucose 92 MG/DL 


 


Calcium Level 9.0 MG/DL 


 


Sodium Level 140 MEQ/L 


 


Potassium Level 4.2 MEQ/L 


 


Chloride Level 108 MEQ/L 


 


Carbon Dioxide Level 26.9 MEQ/L 


 


Anion Gap 5 MEQ/L 


 


Estimat Glomerular Filtration


Rate 106 ML/MIN 


 


 


C-Reactive Protein


  LESS THAN 0.29


MG/DL











MDM


Medical Decision Making


Medical Screen Exam Complete:  Yes


Emergency Medical Condition:  Yes


Medical Record Reviewed:  Yes


Interpretation(s)





Laboratory Tests








Test


  2/12/18


09:50


 


White Blood Count 3.8 TH/MM3 


 


Red Blood Count 5.40 MIL/MM3 


 


Hemoglobin 13.8 GM/DL 


 


Hematocrit 41.5 % 


 


Mean Corpuscular Volume 77.0 FL 


 


Mean Corpuscular Hemoglobin 25.5 PG 


 


Mean Corpuscular Hemoglobin


Concent 33.2 % 


 


 


Red Cell Distribution Width 15.6 % 


 


Platelet Count 276 TH/MM3 


 


Mean Platelet Volume 6.7 FL 


 


Neutrophils (%) (Auto) 39.5 % 


 


Lymphocytes (%) (Auto) 48.3 % 


 


Monocytes (%) (Auto) 9.2 % 


 


Eosinophils (%) (Auto) 1.8 % 


 


Basophils (%) (Auto) 1.2 % 


 


Neutrophils # (Auto) 1.5 TH/MM3 


 


Lymphocytes # (Auto) 1.8 TH/MM3 


 


Monocytes # (Auto) 0.3 TH/MM3 


 


Eosinophils # (Auto) 0.1 TH/MM3 


 


Basophils # (Auto) 0.0 TH/MM3 


 


CBC Comment DIFF FINAL 


 


Differential Comment  


 


Erythrocyte Sedimentation Rate 1 mm/hr 


 


Blood Urea Nitrogen 12 MG/DL 


 


Creatinine 1.04 MG/DL 


 


Random Glucose 92 MG/DL 


 


Calcium Level 9.0 MG/DL 


 


Sodium Level 140 MEQ/L 


 


Potassium Level 4.2 MEQ/L 


 


Chloride Level 108 MEQ/L 


 


Carbon Dioxide Level 26.9 MEQ/L 


 


Anion Gap 5 MEQ/L 


 


Estimat Glomerular Filtration


Rate 106 ML/MIN 


 


 


C-Reactive Protein


  LESS THAN 0.29


MG/DL





MRI reveals no acute intracranial abnormality.  Area of enhancement left 

frontal calvarium, nonspecific.  Follow-up study in 3 months recommended for 

stability.


Differential Diagnosis


Differential diagnosis includes Bell's palsy, Lyme disease, autoimmune disorder

, schwannoma, intracranial tumor, atypical CVA, intracranial hemorrhage.


Narrative Course


IV was established, labs are drawn and sent, and the patient was placed on 

cardiac telemetry monitoring and continuous pulse oximetry monitoring.  The 

patient was administered Solu-Medrol, Toradol, Compazine, and Benadryl.  I 

reviewed the EMR, the patient had a negative CT the brain performed 2 days ago.

  MRI of the brain was ordered to rule out underlying tumor as causative Bell's 

palsy and headache.  CRP is unremarkable.  Sedimentation rate is normal.  Lyme 

disease PCR pending.  MRI reveals no acute intracranial abnormality, does 

reveal area of enhancement left frontal calvarium, nonspecific.  Follow-up 

study in 3 months recommended for stability.





Diagnosis





 Primary Impression:  


 Bell's palsy


 Additional Impression:  


 Headache


 Qualified Codes:  R51 - Headache


Patient Instructions:  General Instructions





***Additional Instructions:  


Take medications as previously directed.  Follow-up with neurology.  Please 

provide the patient a copy of his labs at discharge and MRI at discharge.


***Med/Other Pt SpecificInfo:  No Change to Meds


Disposition:  01 DISCHARGE HOME


Condition:  Stable











Mark Hernandez MD Feb 12, 2018 09:44

## 2021-06-15 NOTE — RADRPT
EXAM DATE/TIME:  04/03/2017 17:54 

 

[facility] COMPARISON:     

CHEST SINGLE AP, April 16, 2016, 15:25.

 

                     

INDICATIONS :     

Chest pain.

                     

 

MEDICAL HISTORY :     

None.          

 

SURGICAL HISTORY :     

None.   

 

ENCOUNTER:     

Initial                                        

 

ACUITY:     

4 - 6 days      

 

PAIN SCORE:     

9/10

 

LOCATION:     

Bilateral chest 

 

FINDINGS:     

The lungs are clear without infiltrate, nodule, or mass.  There is no appreciable pleural effusion fo
r technique.  Heart and mediastinum are unremarkable.

 

CONCLUSION:         No acute cardiopulmonary disease.

 

 

 CELE Brady MD on April 03, 2017 at 18:22           

Board Certified Radiologist.

 This report was verified electronically.
No